# Patient Record
Sex: FEMALE | Race: WHITE | NOT HISPANIC OR LATINO | URBAN - METROPOLITAN AREA
[De-identification: names, ages, dates, MRNs, and addresses within clinical notes are randomized per-mention and may not be internally consistent; named-entity substitution may affect disease eponyms.]

---

## 2018-01-02 ENCOUNTER — OFFICE (OUTPATIENT)
Dept: URBAN - METROPOLITAN AREA CLINIC 19 | Facility: CLINIC | Age: 78
End: 2018-01-02

## 2018-01-02 VITALS
SYSTOLIC BLOOD PRESSURE: 119 MMHG | WEIGHT: 135 LBS | HEIGHT: 62 IN | HEART RATE: 76 BPM | DIASTOLIC BLOOD PRESSURE: 57 MMHG

## 2018-01-02 DIAGNOSIS — E34.0 CARCINOID SYNDROME: ICD-10-CM

## 2018-01-02 DIAGNOSIS — Z86.010 PERSONAL HISTORY OF COLONIC POLYPS: ICD-10-CM

## 2018-01-02 DIAGNOSIS — K21.9 GASTRO-ESOPHAGEAL REFLUX DISEASE WITHOUT ESOPHAGITIS: ICD-10-CM

## 2018-01-02 PROCEDURE — 99213 OFFICE O/P EST LOW 20 MIN: CPT | Performed by: INTERNAL MEDICINE

## 2018-01-02 NOTE — SERVICEHPINOTES
Patient had a 3-4 mm Tubular adenoma with Dr. Harrison in 2012 she is due for colonoscopy surveillance. She takes Zantac 300 mg twice daily for GERD. Dr. Sarah refills her Zantac. (She is unable to take PPIs due to carcinoid syndrome- per recommendation from Dr. Delarosa at [facility].). 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 Her duodenal carcinoids were removed Dr. Lilly at [facility] surgically around 2011. Most recent EUS on 01/14/16 performed by Dr. Tipton was negative for carcinoid. In past, octreotide scans and multiple EUS evaluations by Dr. Church were negative.Dr. Delarosa, her hematologist at [facility] in [place], believes that she does have some microscopic involvement of her liver causing carcinoid syndrome as she has intermittent episodes of flushing, abdominal cramping, diarrhea, and skin changes. Dr. Delarosa has recommended she have an upper GI endoscopic surveillance of her duodenum every year.  Specifically, he has recommended alternating endoscopic ultrasound and EGD his yearly.  She now takes Somatuline depot rather than Sandostatin. She takes depot octreotide as needed for flushing, diarrhea, abdominal pain, or skin changes She denies any fevers or chills. There is been no rectal bleeding. Her last EGD was August 2014 with no duodenal polyps seen. There was extrinsic compression of the stomach at that time. She was pretreated with subcutaneous octreotide prior to this procedure.Patient is due for colon surveillance.  She had a 3-4 millimeter tubular adenoma removed in 2012.  Patient had a stent graft placed for her AAA in June 2016. Subsequently, there have been findings of a small leak within the graft which has appeared a bit larger on 1 or 2 of her 6 month follow-up C.T.s. She has had multiple CT scans of the abdomen at [facility]. Most recent was November and next is for July 2017.Has seen Dr. Gandhi for irregular HR complicated by fainting patient states hx of AV block felt to be the cause. Has a loop recorder in place now and has done better with application of Pindolol.

## 2018-02-22 ENCOUNTER — OFFICE (OUTPATIENT)
Dept: URBAN - METROPOLITAN AREA CLINIC 9 | Facility: CLINIC | Age: 78
End: 2018-02-22

## 2018-03-28 ENCOUNTER — ON CAMPUS - OUTPATIENT (OUTPATIENT)
Dept: URBAN - METROPOLITAN AREA HOSPITAL 131 | Facility: HOSPITAL | Age: 78
End: 2018-03-28

## 2018-03-28 DIAGNOSIS — K44.9 DIAPHRAGMATIC HERNIA WITHOUT OBSTRUCTION OR GANGRENE: ICD-10-CM

## 2018-03-28 DIAGNOSIS — R13.10 DYSPHAGIA, UNSPECIFIED: ICD-10-CM

## 2018-03-28 DIAGNOSIS — C7B.09 SECONDARY CARCINOID TUMORS OF OTHER SITES: ICD-10-CM

## 2018-03-28 PROCEDURE — 43239 EGD BIOPSY SINGLE/MULTIPLE: CPT | Mod: XS | Performed by: INTERNAL MEDICINE

## 2018-03-28 PROCEDURE — 43248 EGD GUIDE WIRE INSERTION: CPT | Performed by: INTERNAL MEDICINE

## 2018-09-11 ENCOUNTER — OFFICE (OUTPATIENT)
Dept: URBAN - METROPOLITAN AREA CLINIC 19 | Facility: CLINIC | Age: 78
End: 2018-09-11

## 2018-09-11 VITALS
HEIGHT: 62 IN | HEART RATE: 62 BPM | WEIGHT: 132 LBS | DIASTOLIC BLOOD PRESSURE: 62 MMHG | SYSTOLIC BLOOD PRESSURE: 117 MMHG

## 2018-09-11 DIAGNOSIS — I10 ESSENTIAL (PRIMARY) HYPERTENSION: ICD-10-CM

## 2018-09-11 DIAGNOSIS — R19.7 DIARRHEA, UNSPECIFIED: ICD-10-CM

## 2018-09-11 DIAGNOSIS — Z86.010 PERSONAL HISTORY OF COLONIC POLYPS: ICD-10-CM

## 2018-09-11 PROCEDURE — 99213 OFFICE O/P EST LOW 20 MIN: CPT | Performed by: INTERNAL MEDICINE

## 2018-10-17 ENCOUNTER — AMBULATORY SURGICAL CENTER (OUTPATIENT)
Dept: URBAN - METROPOLITAN AREA SURGERY 2 | Facility: SURGERY | Age: 78
End: 2018-10-17
Payer: MEDICARE

## 2018-10-17 VITALS
DIASTOLIC BLOOD PRESSURE: 61 MMHG | HEART RATE: 70 BPM | WEIGHT: 135 LBS | SYSTOLIC BLOOD PRESSURE: 99 MMHG | OXYGEN SATURATION: 97 % | HEART RATE: 69 BPM | DIASTOLIC BLOOD PRESSURE: 57 MMHG | SYSTOLIC BLOOD PRESSURE: 99 MMHG | HEART RATE: 69 BPM | SYSTOLIC BLOOD PRESSURE: 113 MMHG | DIASTOLIC BLOOD PRESSURE: 43 MMHG | DIASTOLIC BLOOD PRESSURE: 43 MMHG | SYSTOLIC BLOOD PRESSURE: 113 MMHG | RESPIRATION RATE: 16 BRPM | SYSTOLIC BLOOD PRESSURE: 90 MMHG | OXYGEN SATURATION: 98 % | RESPIRATION RATE: 16 BRPM | DIASTOLIC BLOOD PRESSURE: 56 MMHG | SYSTOLIC BLOOD PRESSURE: 90 MMHG | HEART RATE: 68 BPM | TEMPERATURE: 98.1 F | HEIGHT: 62 IN | RESPIRATION RATE: 18 BRPM | TEMPERATURE: 97.6 F | HEART RATE: 70 BPM | RESPIRATION RATE: 18 BRPM | OXYGEN SATURATION: 97 % | HEIGHT: 62 IN | HEART RATE: 68 BPM | DIASTOLIC BLOOD PRESSURE: 61 MMHG | TEMPERATURE: 97.6 F | SYSTOLIC BLOOD PRESSURE: 108 MMHG | WEIGHT: 135 LBS | DIASTOLIC BLOOD PRESSURE: 57 MMHG | DIASTOLIC BLOOD PRESSURE: 56 MMHG | SYSTOLIC BLOOD PRESSURE: 108 MMHG | OXYGEN SATURATION: 98 % | TEMPERATURE: 98.1 F

## 2018-10-17 DIAGNOSIS — Z86.010 PERSONAL HISTORY OF COLONIC POLYPS: ICD-10-CM

## 2018-10-17 DIAGNOSIS — K57.30 DIVERTICULOSIS OF LARGE INTESTINE WITHOUT PERFORATION OR ABS: ICD-10-CM

## 2018-10-17 PROCEDURE — G0105 COLORECTAL SCRN; HI RISK IND: HCPCS | Performed by: INTERNAL MEDICINE

## 2018-10-17 PROCEDURE — G8907 PT DOC NO EVENTS ON DISCHARG: HCPCS | Performed by: INTERNAL MEDICINE

## 2018-10-17 PROCEDURE — G8918 PT W/O PREOP ORDER IV AB PRO: HCPCS | Performed by: INTERNAL MEDICINE

## 2018-10-17 RX ADMIN — OCTREOTIDE ACETATE 500 MCG: 200 INJECTION, SOLUTION INTRAVENOUS; SUBCUTANEOUS at 12:16

## 2023-12-12 ENCOUNTER — OFFICE (OUTPATIENT)
Dept: URBAN - METROPOLITAN AREA CLINIC 19 | Facility: CLINIC | Age: 83
End: 2023-12-12

## 2023-12-12 VITALS
DIASTOLIC BLOOD PRESSURE: 64 MMHG | WEIGHT: 124 LBS | HEIGHT: 62 IN | OXYGEN SATURATION: 98 % | SYSTOLIC BLOOD PRESSURE: 138 MMHG | HEART RATE: 70 BPM

## 2023-12-12 DIAGNOSIS — E34.0 CARCINOID SYNDROME: ICD-10-CM

## 2023-12-12 DIAGNOSIS — R14.0 ABDOMINAL DISTENSION (GASEOUS): ICD-10-CM

## 2023-12-12 DIAGNOSIS — I71.40 ABDOMINAL AORTIC ANEURYSM, WITHOUT RUPTURE, UNSPECIFIED: ICD-10-CM

## 2023-12-12 DIAGNOSIS — R19.7 DIARRHEA, UNSPECIFIED: ICD-10-CM

## 2023-12-12 DIAGNOSIS — R11.0 NAUSEA: ICD-10-CM

## 2023-12-12 PROCEDURE — 99204 OFFICE O/P NEW MOD 45 MIN: CPT | Performed by: NURSE PRACTITIONER

## 2023-12-12 RX ORDER — DICYCLOMINE HYDROCHLORIDE 10 MG/1
CAPSULE ORAL
Qty: 60 | Refills: 3 | Status: COMPLETED
Start: 2023-12-12 | End: 2024-02-26

## 2023-12-12 NOTE — SERVICENOTES
Spoke with Dr. Birmingham's nurse who recommended CTA with runoff so AAA can be evaluated at same time of CT for bloating, abdominal pain and diarrhea

## 2023-12-12 NOTE — SERVICEHPINOTES
Mrs. Dorsey  presents with a 2-3 month history of abdominal pain, bloating, and diarrhea.   She has a history of  duodenal carcinoid and carcinoid syndrome in the past that did require surgical resection.  she has been experiencing abdominal pain.  It is worse 1st thing in the morning after breakfast.  Sometimes it will last 3 or 4 hours, and sometimes it will last all day.  She is also having severe bloating.  She denies any precipitating factors except she did change her diet to a more high-fiber diet in order to help with her triglycerides.  This caused significant bloating and she had cut back.  She is also having more diarrhea than what she typically experiences.  The diarrhea would occur occasionally, but now it is occurring almost every single day.  She denies any recent antibiotics or medication changes except for adding zetia several months ago.  she continues getting Lanreotide injections every 4 weeks from her oncologist office.  she has a history of an abdominal aortic aneurysm and does have an endograft leaking. She sees Dr. Birmingham,   And they have been following her annually.  At her last check in February, it was measuring around 4-4.5 cm.  She saw her PCP on December 7th that shows   her AA measuring 5.6 cm X 4.0 cm.  Considering she is having postprandial pain and bloating, stenosis needs to be ruled out.  Lab work from her PCP was unremarkable including CBC, CMP, and TSH. She has not tried medication for the diarrhea. Last colonoscopy in 2018 showed diverticulosis.

## 2024-01-25 ENCOUNTER — OFFICE (OUTPATIENT)
Dept: URBAN - METROPOLITAN AREA CLINIC 19 | Facility: CLINIC | Age: 84
End: 2024-01-25

## 2024-01-25 VITALS
OXYGEN SATURATION: 98 % | SYSTOLIC BLOOD PRESSURE: 160 MMHG | HEART RATE: 68 BPM | WEIGHT: 124 LBS | DIASTOLIC BLOOD PRESSURE: 67 MMHG | HEIGHT: 62 IN

## 2024-01-25 DIAGNOSIS — R14.0 ABDOMINAL DISTENSION (GASEOUS): ICD-10-CM

## 2024-01-25 DIAGNOSIS — B37.9 CANDIDIASIS, UNSPECIFIED: ICD-10-CM

## 2024-01-25 DIAGNOSIS — R19.4 CHANGE IN BOWEL HABIT: ICD-10-CM

## 2024-01-25 DIAGNOSIS — D48.9 NEOPLASM OF UNCERTAIN BEHAVIOR, UNSPECIFIED: ICD-10-CM

## 2024-01-25 PROCEDURE — 99214 OFFICE O/P EST MOD 30 MIN: CPT | Performed by: NURSE PRACTITIONER

## 2024-01-25 RX ORDER — FLUCONAZOLE 150 MG/1
TABLET ORAL
Qty: 1 | Refills: 0 | Status: ACTIVE
Start: 2024-01-25

## 2024-01-25 NOTE — SERVICEHPINOTES
Mrs. Dorsey   Presents with persistent bloating, nausea, abdominal pain, and change in her bowel habits.  She is still feeling significantly bloated and tender.  She was treated for C diff and is no longer having diarrhea.  She feels like her abdomen is so bloated and distended that is pushing on her bladder.  She also reports a change in her bowel habits.  The change in her stool started several years ago, but it has gotten worse in the last month or 2. Her stools are very small and thin in caliber.  She feels like they are hard to pass.  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  She feels like there may be scar tissue that has built up since this surgery.  She has a history of a carcinoid tumor that does not show up on an octreotide scan.  24 hour 5 HIAA urine testing was normal.    CT scan of the abdomen and pelvis was unremarkable for of the bloating and change in bowel habits. She is not having any flushing.  She had a  colonoscopy in 2018 that was unremarkable.  She is not wanting a colonoscopy but is interested in sigmoidoscopy.

## 2024-02-22 ENCOUNTER — OFFICE (OUTPATIENT)
Dept: URBAN - METROPOLITAN AREA CLINIC 19 | Facility: CLINIC | Age: 84
End: 2024-02-22
Payer: COMMERCIAL

## 2024-02-22 VITALS
OXYGEN SATURATION: 100 % | WEIGHT: 122 LBS | HEART RATE: 69 BPM | HEIGHT: 62 IN | SYSTOLIC BLOOD PRESSURE: 162 MMHG | DIASTOLIC BLOOD PRESSURE: 70 MMHG

## 2024-02-22 DIAGNOSIS — E34.0 CARCINOID SYNDROME: ICD-10-CM

## 2024-02-22 DIAGNOSIS — A04.72 ENTEROCOLITIS DUE TO CLOSTRIDIUM DIFFICILE, NOT SPECIFIED AS: ICD-10-CM

## 2024-02-22 DIAGNOSIS — R19.4 CHANGE IN BOWEL HABIT: ICD-10-CM

## 2024-02-22 PROCEDURE — 99214 OFFICE O/P EST MOD 30 MIN: CPT | Performed by: INTERNAL MEDICINE

## 2024-03-05 ENCOUNTER — AMBULATORY SURGICAL CENTER (OUTPATIENT)
Dept: URBAN - METROPOLITAN AREA SURGERY 2 | Facility: SURGERY | Age: 84
End: 2024-03-05
Payer: COMMERCIAL

## 2024-03-05 ENCOUNTER — OFFICE (OUTPATIENT)
Dept: URBAN - METROPOLITAN AREA PATHOLOGY 12 | Facility: PATHOLOGY | Age: 84
End: 2024-03-05
Payer: COMMERCIAL

## 2024-03-05 VITALS
RESPIRATION RATE: 188 BRPM | DIASTOLIC BLOOD PRESSURE: 62 MMHG | SYSTOLIC BLOOD PRESSURE: 137 MMHG | SYSTOLIC BLOOD PRESSURE: 120 MMHG | TEMPERATURE: 98.2 F | OXYGEN SATURATION: 99 % | DIASTOLIC BLOOD PRESSURE: 88 MMHG | SYSTOLIC BLOOD PRESSURE: 120 MMHG | WEIGHT: 120 LBS | WEIGHT: 120 LBS | OXYGEN SATURATION: 99 % | DIASTOLIC BLOOD PRESSURE: 77 MMHG | TEMPERATURE: 97.3 F | SYSTOLIC BLOOD PRESSURE: 137 MMHG | HEART RATE: 66 BPM | RESPIRATION RATE: 18 BRPM | SYSTOLIC BLOOD PRESSURE: 153 MMHG | TEMPERATURE: 98.2 F | HEART RATE: 66 BPM | OXYGEN SATURATION: 95 % | DIASTOLIC BLOOD PRESSURE: 77 MMHG | DIASTOLIC BLOOD PRESSURE: 54 MMHG | SYSTOLIC BLOOD PRESSURE: 149 MMHG | HEART RATE: 78 BPM | HEIGHT: 62 IN | HEART RATE: 78 BPM | TEMPERATURE: 97.3 F | HEIGHT: 62 IN | DIASTOLIC BLOOD PRESSURE: 62 MMHG | DIASTOLIC BLOOD PRESSURE: 88 MMHG | SYSTOLIC BLOOD PRESSURE: 149 MMHG | SYSTOLIC BLOOD PRESSURE: 153 MMHG | DIASTOLIC BLOOD PRESSURE: 54 MMHG | OXYGEN SATURATION: 95 % | RESPIRATION RATE: 188 BRPM | RESPIRATION RATE: 18 BRPM

## 2024-03-05 DIAGNOSIS — K59.9 FUNCTIONAL INTESTINAL DISORDER, UNSPECIFIED: ICD-10-CM

## 2024-03-05 DIAGNOSIS — R19.7 DIARRHEA, UNSPECIFIED: ICD-10-CM

## 2024-03-05 PROCEDURE — 88305 TISSUE EXAM BY PATHOLOGIST: CPT | Mod: TC | Performed by: STUDENT IN AN ORGANIZED HEALTH CARE EDUCATION/TRAINING PROGRAM

## 2024-03-05 PROCEDURE — 45331 SIGMOIDOSCOPY AND BIOPSY: CPT | Performed by: INTERNAL MEDICINE

## 2024-03-07 LAB
GASTRO ONE PATHOLOGY: PDF REPORT: (no result)
GASTRO ONE PATHOLOGY: PDF REPORT: (no result)